# Patient Record
Sex: FEMALE | Race: WHITE | NOT HISPANIC OR LATINO | Employment: UNEMPLOYED | URBAN - METROPOLITAN AREA
[De-identification: names, ages, dates, MRNs, and addresses within clinical notes are randomized per-mention and may not be internally consistent; named-entity substitution may affect disease eponyms.]

---

## 2019-07-09 ENCOUNTER — HOSPITAL ENCOUNTER (EMERGENCY)
Facility: HOSPITAL | Age: 19
Discharge: HOME | End: 2019-07-09
Admitting: EMERGENCY MEDICINE

## 2019-07-09 VITALS
OXYGEN SATURATION: 100 % | DIASTOLIC BLOOD PRESSURE: 65 MMHG | RESPIRATION RATE: 14 BRPM | WEIGHT: 184.08 LBS | HEART RATE: 72 BPM | HEIGHT: 66 IN | TEMPERATURE: 98.2 F | SYSTOLIC BLOOD PRESSURE: 100 MMHG | BODY MASS INDEX: 29.58 KG/M2

## 2019-07-09 DIAGNOSIS — J06.9 URTI (ACUTE UPPER RESPIRATORY INFECTION): ICD-10-CM

## 2019-07-09 DIAGNOSIS — J02.9 VIRAL PHARYNGITIS: Primary | ICD-10-CM

## 2019-07-09 LAB — S PYO AG THROAT QL: NEGATIVE

## 2019-07-09 PROCEDURE — 87651 STREP A DNA AMP PROBE: CPT | Performed by: PHYSICIAN ASSISTANT

## 2019-07-09 PROCEDURE — 99283 EMERGENCY DEPT VISIT LOW MDM: CPT

## 2019-07-09 RX ORDER — METHYLPREDNISOLONE 4 MG/1
TABLET ORAL
Qty: 21 TABLET | Refills: 0 | Status: SHIPPED | OUTPATIENT
Start: 2019-07-09 | End: 2021-07-01 | Stop reason: ALTCHOICE

## 2019-07-09 NOTE — ED PROVIDER NOTES
"Subjective   History: Patient is a 18 year old female who presents to the ER with a sore throat, congestion and HA for the past week. She denies any significant SOB or cough, but does report that she'll cough to clear her throat. She reports that before last week she had no symptoms. She denies trying any OTC medications to help with symptoms. She does not have a PCP currently.       Onset: 1 week  Location: throat and head  Duration: constant  Character: \"sore\" throat and congestion  Aggravating/Alleviating factors: None  Radiation: none  Severity: moderate              Review of Systems   Constitutional: Negative.  Negative for activity change, appetite change, fatigue and fever.   HENT: Positive for congestion and sore throat.    Eyes: Negative.    Respiratory: Negative for cough.    Cardiovascular: Negative.    Gastrointestinal: Negative.    Genitourinary: Negative.    Musculoskeletal: Negative.    Skin: Negative.    Neurological: Negative.        No past medical history on file.    No Known Allergies    No past surgical history on file.    No family history on file.    Social History     Socioeconomic History   • Marital status: Single     Spouse name: Not on file   • Number of children: Not on file   • Years of education: Not on file   • Highest education level: Not on file           Objective   Physical Exam   Constitutional: She is oriented to person, place, and time. She appears well-developed and well-nourished.   HENT:   Head: Normocephalic and atraumatic.   Mouth/Throat: Uvula is midline, oropharynx is clear and moist and mucous membranes are normal. Tonsils are 1+ on the right. Tonsils are 1+ on the left. No tonsillar exudate.   Eyes: Pupils are equal, round, and reactive to light.   Neck: Normal range of motion.   Cardiovascular: Normal rate and regular rhythm.   Pulmonary/Chest: Effort normal and breath sounds normal.   Musculoskeletal: Normal range of motion.   Neurological: She is alert and oriented " to person, place, and time.   Skin: Skin is warm and dry.   Psychiatric: She has a normal mood and affect. Her behavior is normal.       Procedures           ED Course          No radiology results for the last day  Labs Reviewed   RAPID STREP A SCREEN - Normal     Medications - No data to display          MDM  Number of Diagnoses or Management Options  URTI (acute upper respiratory infection):   Viral pharyngitis:   Diagnosis management comments: DISPOSITION:   Chart Review:  Comorbidity: None reported  Differentials: viral infection, strep pharnygitis  ;this list is not all inclusive and does not constitute the entirety of considered causes  ECG:  Labs: Strep negative  Imaging:   Disposition/Treatment:  18-year-old female presents to the ER complaining of sore throat and congestion for the past week.  Patient has not tried any over-the-counter medications.  Patient was given a Medrol Dosepak and Chloraseptic Spray.  She was told to follow-up with health department in 2 to 3 days if she was no better.  She was stable at time of discharge.  Return precautions objects were provided she was in agreement with plan.       Amount and/or Complexity of Data Reviewed  Clinical lab tests: reviewed    Patient Progress  Patient progress: stable        Final diagnoses:   Viral pharyngitis   URTI (acute upper respiratory infection)            Sada Hernandez PA-C  07/09/19 1111

## 2019-07-09 NOTE — DISCHARGE INSTRUCTIONS
Return to the ER for any worsening symptoms. Follow up with [facility] health department for any continuing problems.

## 2019-12-31 ENCOUNTER — HOSPITAL ENCOUNTER (EMERGENCY)
Facility: HOSPITAL | Age: 19
Discharge: HOME | End: 2020-01-01
Admitting: EMERGENCY MEDICINE

## 2019-12-31 DIAGNOSIS — N30.01 ACUTE CYSTITIS WITH HEMATURIA: Primary | ICD-10-CM

## 2019-12-31 LAB
B-HCG UR QL: NEGATIVE
BACTERIA UR QL AUTO: ABNORMAL /HPF
BILIRUB UR QL STRIP: NEGATIVE
CLARITY UR: ABNORMAL
COLOR UR: YELLOW
GLUCOSE UR STRIP-MCNC: NEGATIVE MG/DL
HGB UR QL STRIP.AUTO: ABNORMAL
HYALINE CASTS UR QL AUTO: ABNORMAL /LPF
KETONES UR QL STRIP: NEGATIVE
LEUKOCYTE ESTERASE UR QL STRIP.AUTO: ABNORMAL
NITRITE UR QL STRIP: NEGATIVE
PH UR STRIP.AUTO: 5.5 [PH] (ref 5–8)
PROT UR QL STRIP: ABNORMAL
RBC # UR: ABNORMAL /HPF
REF LAB TEST METHOD: ABNORMAL
SP GR UR STRIP: 1.02 (ref 1–1.03)
SQUAMOUS #/AREA URNS HPF: ABNORMAL /HPF
UROBILINOGEN UR QL STRIP: ABNORMAL
WBC UR QL AUTO: ABNORMAL /HPF

## 2019-12-31 PROCEDURE — 87186 SC STD MICRODIL/AGAR DIL: CPT | Performed by: EMERGENCY MEDICINE

## 2019-12-31 PROCEDURE — 87086 URINE CULTURE/COLONY COUNT: CPT | Performed by: EMERGENCY MEDICINE

## 2019-12-31 PROCEDURE — 81001 URINALYSIS AUTO W/SCOPE: CPT | Performed by: EMERGENCY MEDICINE

## 2019-12-31 PROCEDURE — 87088 URINE BACTERIA CULTURE: CPT | Performed by: EMERGENCY MEDICINE

## 2019-12-31 PROCEDURE — 99284 EMERGENCY DEPT VISIT MOD MDM: CPT

## 2019-12-31 PROCEDURE — 96372 THER/PROPH/DIAG INJ SC/IM: CPT

## 2019-12-31 PROCEDURE — 81025 URINE PREGNANCY TEST: CPT | Performed by: EMERGENCY MEDICINE

## 2019-12-31 PROCEDURE — 96374 THER/PROPH/DIAG INJ IV PUSH: CPT

## 2019-12-31 RX ORDER — ONDANSETRON 4 MG/1
8 TABLET, ORALLY DISINTEGRATING ORAL ONCE
Status: COMPLETED | OUTPATIENT
Start: 2019-12-31 | End: 2019-12-31

## 2019-12-31 RX ADMIN — ONDANSETRON 8 MG: 4 TABLET, ORALLY DISINTEGRATING ORAL at 23:13

## 2020-01-01 VITALS
HEIGHT: 66 IN | WEIGHT: 190.7 LBS | DIASTOLIC BLOOD PRESSURE: 72 MMHG | RESPIRATION RATE: 16 BRPM | OXYGEN SATURATION: 96 % | HEART RATE: 104 BPM | SYSTOLIC BLOOD PRESSURE: 123 MMHG | TEMPERATURE: 98.4 F | BODY MASS INDEX: 30.65 KG/M2

## 2020-01-01 PROCEDURE — 96372 THER/PROPH/DIAG INJ SC/IM: CPT

## 2020-01-01 PROCEDURE — 96374 THER/PROPH/DIAG INJ IV PUSH: CPT

## 2020-01-01 PROCEDURE — 25010000002 CEFTRIAXONE PER 250 MG: Performed by: EMERGENCY MEDICINE

## 2020-01-01 RX ORDER — LIDOCAINE HYDROCHLORIDE 10 MG/ML
2.1 INJECTION, SOLUTION EPIDURAL; INFILTRATION; INTRACAUDAL; PERINEURAL ONCE
Status: COMPLETED | OUTPATIENT
Start: 2020-01-01 | End: 2020-01-01

## 2020-01-01 RX ORDER — CEFTRIAXONE 1 G/1
1000 INJECTION, POWDER, FOR SOLUTION INTRAMUSCULAR; INTRAVENOUS ONCE
Status: COMPLETED | OUTPATIENT
Start: 2020-01-01 | End: 2020-01-01

## 2020-01-01 RX ORDER — NITROFURANTOIN 25; 75 MG/1; MG/1
100 CAPSULE ORAL 2 TIMES DAILY
Qty: 10 CAPSULE | Refills: 0 | Status: SHIPPED | OUTPATIENT
Start: 2020-01-01 | End: 2021-07-01 | Stop reason: ALTCHOICE

## 2020-01-01 RX ORDER — ONDANSETRON 8 MG/1
8 TABLET, ORALLY DISINTEGRATING ORAL EVERY 8 HOURS PRN
Qty: 10 TABLET | Refills: 0 | Status: SHIPPED | OUTPATIENT
Start: 2020-01-01 | End: 2021-07-01 | Stop reason: ALTCHOICE

## 2020-01-01 RX ADMIN — LIDOCAINE HYDROCHLORIDE 2.1 ML: 10 INJECTION, SOLUTION EPIDURAL; INFILTRATION; INTRACAUDAL; PERINEURAL at 01:20

## 2020-01-01 RX ADMIN — CEFTRIAXONE SODIUM 1000 MG: 1 INJECTION, POWDER, FOR SOLUTION INTRAMUSCULAR; INTRAVENOUS at 01:20

## 2020-01-01 NOTE — ED NOTES
Pt c/o or right sided flank pain with dysuria that started yesterday, pt also reports nausea and vomiting, pt reports that her urine is dark and cloudy with spots of blood and discharge     Teena Montalvo, RN  12/31/19 2097

## 2020-01-01 NOTE — ED PROVIDER NOTES
Subjective   19-year-old female reports the onset of urinary symptoms in the last 2 days.  She reports frequency and burning.  She denies vaginal bleeding or discharge.  She reports subjective fever this afternoon as well as chills.  She has had some nausea and states she vomited once and she states she is tolerated oral liquids well.  She reports no diarrhea she denies myalgias or arthralgias          Review of Systems   Constitutional: Positive for chills, fatigue and fever.   Gastrointestinal: Positive for nausea and vomiting.   Genitourinary: Positive for dysuria, frequency, hematuria and urgency.   Hematological: Does not bruise/bleed easily.   All other systems reviewed and are negative.      No past medical history on file.  Reports no chronic medical problems  No Known Allergies    No past surgical history on file.    No family history on file.    Social History     Socioeconomic History   • Marital status: Single     Spouse name: Not on file   • Number of children: Not on file   • Years of education: Not on file   • Highest education level: Not on file           Objective   Physical Exam  Alert [place] Coma Scale 15   HEENT: Pupils equal and reactive to light. Conjunctivae are not injected. normal tympanic membranes. Oropharynx and nares are normal.   Neck: Supple. Midline trachea. No JVD. No goiter.   Chest: Clear and equal breath sounds bilaterally regular rate and rhythm without murmur or rub.   Abdomen: Positive bowel sounds nontender nondistended. No rebound or peritoneal signs. No CVA tenderness.   Extremities no clubbing cyanosis or edema motor sensory exam is normal the full range of motion is intact   skin: Warm and dry, no rashes or petechia.   Lymphatic: No regional lymphadenopathy. No calf pain, swelling or Jeff's sign    Procedures           ED Course  ED Course as of Jan 01 0113 Wed Jan 01, 2020   0100 Felt better with ER therapy was able to tolerate oral liquids    [TH]      ED Course User  Index  [TH] Rory Wells MD           Labs Reviewed   URINALYSIS W/ CULTURE IF INDICATED - Abnormal; Notable for the following components:       Result Value    Appearance, UA Turbid (*)     Blood, UA Large (3+) (*)     Protein, UA >=300 mg/dL (3+) (*)     Leuk Esterase, UA Moderate (2+) (*)     All other components within normal limits   URINALYSIS, MICROSCOPIC ONLY - Abnormal; Notable for the following components:    RBC, UA Too Numerous to Count (*)     WBC, UA 13-20 (*)     Bacteria, UA 1+ (*)     Squamous Epithelial Cells, UA 3-6 (*)     All other components within normal limits   PREGNANCY, URINE - Normal   URINE CULTURE     Medications   cefTRIAXone (ROCEPHIN) injection 1,000 mg (has no administration in time range)   lidocaine PF 1% (XYLOCAINE) injection 2.1 mL (has no administration in time range)   ondansetron ODT (ZOFRAN-ODT) disintegrating tablet 8 mg (8 mg Oral Given 12/31/19 8710)                                         MDM  Number of Diagnoses or Management Options     Amount and/or Complexity of Data Reviewed  Clinical lab tests: reviewed  Review and summarize past medical records: yes  Independent visualization of images, tracings, or specimens: yes    Risk of Complications, Morbidity, and/or Mortality  Presenting problems: high  Diagnostic procedures: high  Management options: high  General comments: The patient was given Zofran and nausea resolved.  Patient is injected with ceftriaxone she was discharged with a prescription for Macrobid and Zofran.  The patient was stable at discharge and vocalized understanding of discharge instructions and warnings        Final diagnoses:   Acute cystitis with hematuria            Rory Wells MD  01/01/20 0114

## 2020-01-03 LAB — BACTERIA SPEC AEROBE CULT: ABNORMAL

## 2020-01-03 NOTE — PROGRESS NOTES
Notes recorded by Marianne Patrick RPH on 1/3/2020 at 10:02 AM EST  Final result: susceptible to Macrobid. No further follow up needed from ED.   Marianne Patrick PharmD  10:02 AM 1/3/2020    ------    Notes recorded by Marianne Patrick RPH on 1/2/2020 at 9:48 AM EST  Urine culture: >100k E.coli, C&S pending. Pt discharged from ED with Rx for Macrobid.  Marianne Patrick PharmD  9:48 AM 1/2/2020